# Patient Record
Sex: FEMALE | Race: BLACK OR AFRICAN AMERICAN | Employment: FULL TIME | URBAN - METROPOLITAN AREA
[De-identification: names, ages, dates, MRNs, and addresses within clinical notes are randomized per-mention and may not be internally consistent; named-entity substitution may affect disease eponyms.]

---

## 2021-04-14 PROBLEM — N64.3 GALACTORRHEA: Status: ACTIVE | Noted: 2021-04-14

## 2021-04-15 ENCOUNTER — HOSPITAL ENCOUNTER (OUTPATIENT)
Dept: LAB | Age: 51
Discharge: HOME | End: 2021-04-15
Payer: COMMERCIAL

## 2021-04-15 DIAGNOSIS — E23.6 EMPTY SELLA (HCC): ICD-10-CM

## 2021-04-15 DIAGNOSIS — E22.1 HYPERPROLACTINEMIA (HCC): ICD-10-CM

## 2021-04-15 LAB
ALBUMIN SERPL-MCNC: 3.6 G/DL (ref 3.5–5)
ALBUMIN/GLOB SERPL: 0.9 {RATIO} (ref 1.2–3.5)
ALP SERPL-CCNC: 81 U/L (ref 50–136)
ALT SERPL-CCNC: 13 U/L (ref 12–65)
ANION GAP SERPL CALC-SCNC: 6 MMOL/L (ref 7–16)
AST SERPL-CCNC: 10 U/L (ref 15–37)
BILIRUB SERPL-MCNC: 0.3 MG/DL (ref 0.2–1.1)
BUN SERPL-MCNC: 10 MG/DL (ref 6–23)
CALCIUM SERPL-MCNC: 9.4 MG/DL (ref 8.3–10.4)
CHLORIDE SERPL-SCNC: 106 MMOL/L (ref 98–107)
CO2 SERPL-SCNC: 27 MMOL/L (ref 21–32)
CORTIS AM PEAK SERPL-MCNC: 18.8 UG/DL (ref 7–25)
CREAT SERPL-MCNC: 0.99 MG/DL (ref 0.6–1)
GLOBULIN SER CALC-MCNC: 4.1 G/DL (ref 2.3–3.5)
GLUCOSE SERPL-MCNC: 71 MG/DL (ref 65–100)
POTASSIUM SERPL-SCNC: 4.4 MMOL/L (ref 3.5–5.1)
PROLACTIN SERPL-MCNC: 18.5 NG/ML
PROT SERPL-MCNC: 7.7 G/DL (ref 6.3–8.2)
SODIUM SERPL-SCNC: 139 MMOL/L (ref 136–145)
T4 FREE SERPL-MCNC: 0.9 NG/DL (ref 0.78–1.46)
TSH SERPL DL<=0.005 MIU/L-ACNC: 1.3 UIU/ML (ref 0.36–3.74)

## 2021-04-15 PROCEDURE — 80053 COMPREHEN METABOLIC PANEL: CPT

## 2021-04-15 PROCEDURE — 84305 ASSAY OF SOMATOMEDIN: CPT

## 2021-04-15 PROCEDURE — 84439 ASSAY OF FREE THYROXINE: CPT

## 2021-04-15 PROCEDURE — 84443 ASSAY THYROID STIM HORMONE: CPT

## 2021-04-15 PROCEDURE — 84146 ASSAY OF PROLACTIN: CPT

## 2021-04-15 PROCEDURE — 36415 COLL VENOUS BLD VENIPUNCTURE: CPT

## 2021-04-15 PROCEDURE — 82533 TOTAL CORTISOL: CPT

## 2021-04-16 LAB — IGF-I SERPL-MCNC: 148 NG/ML (ref 70–225)

## 2022-03-19 PROBLEM — N64.3 GALACTORRHEA: Status: ACTIVE | Noted: 2021-04-14

## 2022-05-27 DIAGNOSIS — E22.1 HYPERPROLACTINEMIA (HCC): ICD-10-CM

## 2022-05-27 DIAGNOSIS — E22.1 HYPERPROLACTINEMIA (HCC): Primary | ICD-10-CM

## 2022-06-01 ENCOUNTER — OFFICE VISIT (OUTPATIENT)
Dept: ENDOCRINOLOGY | Age: 52
End: 2022-06-01
Payer: COMMERCIAL

## 2022-06-01 VITALS
BODY MASS INDEX: 31.78 KG/M2 | HEART RATE: 68 BPM | WEIGHT: 191 LBS | OXYGEN SATURATION: 98 % | DIASTOLIC BLOOD PRESSURE: 57 MMHG | SYSTOLIC BLOOD PRESSURE: 106 MMHG

## 2022-06-01 DIAGNOSIS — E23.6 EMPTY SELLA (HCC): ICD-10-CM

## 2022-06-01 DIAGNOSIS — D35.2 PROLACTINOMA (HCC): Primary | ICD-10-CM

## 2022-06-01 DIAGNOSIS — E22.1 HYPERPROLACTINEMIA (HCC): ICD-10-CM

## 2022-06-01 PROCEDURE — 99213 OFFICE O/P EST LOW 20 MIN: CPT | Performed by: INTERNAL MEDICINE

## 2022-06-01 RX ORDER — CABERGOLINE 0.5 MG/1
0.5 TABLET ORAL
Qty: 9 TABLET | Refills: 11 | Status: SHIPPED | OUTPATIENT
Start: 2022-06-02 | End: 2022-10-26 | Stop reason: SDUPTHER

## 2022-06-01 NOTE — PROGRESS NOTES
Chavo Bradford MD, [address] [facility] [address]            Reason for visit: follow-up of hyperprolactinemia      ASSESSMENT AND PLAN:    1. Prolactinoma ([address]  She has been out of cabergoline for about 3 weeks. I will renew her prescription and reassess in 4 months. - cabergoline (DOSTINEX) 0.5 MG tablet; Take 1 tablet by mouth Twice a Week  Dispense: 9 tablet; Refill: 11  - Prolactin; Future    2. Hyperprolactinemia ([address]    3. Empty sella ([address]  Assessment of anterior pituitary function was normal (with exception of hyperprolactinemia). Follow-up and Dispositions    · Return in about 4 months (around 10/1/2022). History of Present Illness:    PITUITARY DISEASE  Deejay Sutton is here for follow-up of hyperprolactinemia and a pituitary microadenoma. These were noted as part of the evaluation of galactorrhea which started ~2020.     Symptoms: See review of systems below     Pregnancy history:  3 para 3, status post BTL     Menstrual history: regular, cycle length 28 days, menses 6-9 days     Imaging:  3/9/2021: MRI pituitary ([facility])- Empty sella. 3 x 3 mm tiny focus of hypoenhancement in the inferior lateral margin of the abdomen apophysis, likely a pituitary adenoma. No cavernous sinus or suprasellar extension. Infundibulum is slightly displaced to the left of midline. No mention of the optic chiasm.     Laboratory evaluation:  2021: Prolactin 34.2.  4/15/2021 at 8:51 AM: Prolactin 18.5, IGF-I 148, cortisol 18.8, TSH 1.300, free T4 0.9.  2021: Prolactin 1.8.     Prior/current treatment: Cabergoline 0.5 mg twice weekly was started 2021. She ran out of the medication early to mid May 2022. Review of Systems   Constitutional: Positive for fatigue (improved). Negative for unexpected weight change. Cardiovascular: Negative for palpitations. Endocrine:        Negative for galactorrhea.    Genitourinary: Negative for menstrual problem. Neurological: Negative for tremors. BP (!) 106/57 (Site: Right Upper Arm, Position: Sitting)   Pulse 68   Wt 191 lb (86.6 kg)   SpO2 98%   BMI 31.78 kg/m²   Wt Readings from Last 3 Encounters:   06/01/22 191 lb (86.6 kg)   12/01/21 140 lb (63.5 kg)       Physical Exam  HENT:      Head: Normocephalic. Neck:      Thyroid: No thyroid mass or thyromegaly. Cardiovascular:      Rate and Rhythm: Normal rate. Pulmonary:      Effort: No respiratory distress. Breath sounds: Normal breath sounds. Neurological:      Mental Status: She is alert. Psychiatric:         Mood and Affect: Mood normal.         Behavior: Behavior normal.         Orders Placed This Encounter   Procedures    Prolactin     Standing Status:   Future     Standing Expiration Date:   6/1/2023       Current Outpatient Medications   Medication Sig Dispense Refill    [START ON 6/2/2022] cabergoline (DOSTINEX) 0.5 MG tablet Take 1 tablet by mouth Twice a Week 9 tablet 11    cetirizine (ZYRTEC) 10 MG tablet Take by mouth      ferrous sulfate (IRON 325) 325 (65 Fe) MG tablet TAKE 1 TABLET BY MOUTH TWICE DAILY      omeprazole (PRILOSEC) 20 MG delayed release capsule TAKE 1 CAPSULE BY MOUTH EVERY DAY IN THE MORNING       No current facility-administered medications for this visit. Daphnie Diallo MD, FACE      Portions of this note were generated with the assistance of voice recognition software. As such, some errors in transcription may be present.

## 2022-06-05 LAB — PROLACTIN SERPL-MCNC: 10.5 NG/ML

## 2022-10-26 ENCOUNTER — TELEMEDICINE (OUTPATIENT)
Dept: ENDOCRINOLOGY | Age: 52
End: 2022-10-26
Payer: COMMERCIAL

## 2022-10-26 DIAGNOSIS — E23.6 EMPTY SELLA (HCC): ICD-10-CM

## 2022-10-26 DIAGNOSIS — D35.2 PROLACTINOMA (HCC): Primary | ICD-10-CM

## 2022-10-26 DIAGNOSIS — E22.1 HYPERPROLACTINEMIA (HCC): ICD-10-CM

## 2022-10-26 PROCEDURE — 99213 OFFICE O/P EST LOW 20 MIN: CPT | Performed by: INTERNAL MEDICINE

## 2022-10-26 RX ORDER — CABERGOLINE 0.5 MG/1
0.5 TABLET ORAL
Qty: 9 TABLET | Refills: 11 | Status: SHIPPED | OUTPATIENT
Start: 2022-10-27

## 2022-10-26 NOTE — PROGRESS NOTES
Oneil Teague MD, [address]            Reason for visit: follow-up of hyperprolactinemia    I was in the office while conducting this encounter. Consent:  Mary Leija, who was evaluated through a synchronous (real-time) audio-video encounter, and/or her healthcare decision maker, is aware that it is a billable service, which includes applicable co-pays, with coverage as determined by her insurance carrier. She provided verbal consent to proceed and patient identification was verified. This visit was conducted pursuant to the emergency declaration under the [address] [place] [address], [address] [place] [address] authority and the [employer] Act. A caregiver was present when appropriate. Ability to conduct physical exam was limited. The patient was located at home in a state where the provider was licensed to provide care. This virtual visit was conducted via [address] Pursuant to the emergency declaration under the [address] [place] [address], [facility] authority and the [employer] Act, this Virtual  Visit was conducted to reduce the patient's risk of exposure to COVID-19 and provide continuity of care for an established patient. Services were provided through a video synchronous discussion virtually to substitute for in-person clinic visit. Due to this being a TeleHealth evaluation, many elements of the physical examination are unable to be assessed. --Adalberto Krause MD on 10/26/2022 at 2:44 PM        ASSESSMENT AND PLAN:    1. Prolactinoma ([address]  She has been out of cabergoline for about 4 weeks. I will renew her prescription and reassess in 6 months. - cabergoline (DOSTINEX) 0.5 MG tablet; Take 1 tablet by mouth Twice a Week  Dispense: 9 tablet; Refill: 11  - Prolactin; Future    2.  Hyperprol[address]emia ([facility] [address]    3. Empty sella ([facility] [address]  Assessment of anterior pituitary function was normal (with exception of hyperprolactinemia). Follow-up and Dispositions    Return in about 6 months (around 2023). History of Present Illness:    PITUITARY DISEASE  Mychal Zuluaga is here for follow-up of hyperprolactinemia and a pituitary microadenoma. These were noted as part of the evaluation of galactorrhea which started ~2020. Symptoms: See review of systems below     Pregnancy history:  3 para 3, status post BTL     Menstrual history: regular, cycle length 28 days, menses 6-9 days     Imaging:  3/9/2021: MRI pituitary ([facility])- Empty sella. 3 x 3 mm tiny focus of hypoenhancement in the inferior lateral margin of the abdomen apophysis, likely a pituitary adenoma. No cavernous sinus or suprasellar extension. Infundibulum is slightly displaced to the left of midline. No mention of the optic chiasm. Laboratory evaluation:  2021: Prolactin 34.2.  4/15/2021 at 8:51 AM: Prolactin 18.5, IGF-I 148, cortisol 18.8, TSH 1.300, free T4 0.9.  2021: Prolactin 1.8.  2022: Prolactin 10.5. Prior/current treatment: Cabergoline 0.5 mg twice weekly was started 2021. She ran out of the medication early to mid May 2022. She resumed it in 2022; she ran out again in ~2022. Review of Systems   Constitutional:  Negative for fatigue and unexpected weight change. Cardiovascular:  Negative for palpitations. Endocrine:        Negative for galactorrhea. Genitourinary:  Negative for menstrual problem. Neurological:  Negative for tremors. There were no vitals taken for this visit. Wt Readings from Last 3 Encounters:   22 191 lb (86.6 kg)   21 140 lb (63.5 kg)       Physical Exam  Constitutional:       Appearance: Normal appearance. HENT:      Head: Normocephalic.       Nose: Nose normal.   Eyes:      Extraocular Movements: Extraocular movements intact. Pulmonary:      Effort: Pulmonary effort is normal.   Musculoskeletal:         General: Normal range of motion. Right shoulder: Normal.      Left shoulder: Normal.      Cervical back: Normal range of motion. Skin:     Coloration: Skin is not jaundiced or pale. Neurological:      General: No focal deficit present. Mental Status: She is alert. Mental status is at baseline. Psychiatric:         Mood and Affect: Mood normal.         Behavior: Behavior normal.       Orders Placed This Encounter   Procedures    Prolactin     Standing Status:   Future     Standing Expiration Date:   10/26/2023       Current Outpatient Medications   Medication Sig Dispense Refill    [START ON 10/27/2022] cabergoline (DOSTINEX) 0.5 MG tablet Take 1 tablet by mouth Twice a Week 9 tablet 11    cetirizine (ZYRTEC) 10 MG tablet Take by mouth      ferrous sulfate (IRON 325) 325 (65 Fe) MG tablet TAKE 1 TABLET BY MOUTH TWICE DAILY      omeprazole (PRILOSEC) 20 MG delayed release capsule TAKE 1 CAPSULE BY MOUTH EVERY DAY IN THE MORNING       No current facility-administered medications for this visit. Aurelio Payne MD, FACE      Portions of this note were generated with the assistance of voice recognition software. As such, some errors in transcription may be present.

## 2023-05-19 ENCOUNTER — OFFICE VISIT (OUTPATIENT)
Dept: ENDOCRINOLOGY | Age: 53
End: 2023-05-19
Payer: COMMERCIAL

## 2023-05-19 VITALS
OXYGEN SATURATION: 100 % | HEART RATE: 58 BPM | SYSTOLIC BLOOD PRESSURE: 106 MMHG | WEIGHT: 201 LBS | BODY MASS INDEX: 33.45 KG/M2 | DIASTOLIC BLOOD PRESSURE: 80 MMHG

## 2023-05-19 DIAGNOSIS — D35.2 PROLACTINOMA (HCC): Primary | ICD-10-CM

## 2023-05-19 DIAGNOSIS — E23.6 EMPTY SELLA (HCC): ICD-10-CM

## 2023-05-19 DIAGNOSIS — D35.2 PROLACTINOMA (HCC): ICD-10-CM

## 2023-05-19 DIAGNOSIS — E22.1 HYPERPROLACTINEMIA (HCC): ICD-10-CM

## 2023-05-19 LAB — PROLACTIN SERPL-MCNC: 1.2 NG/ML

## 2023-05-19 PROCEDURE — 99213 OFFICE O/P EST LOW 20 MIN: CPT | Performed by: INTERNAL MEDICINE

## 2023-05-19 RX ORDER — CABERGOLINE 0.5 MG/1
0.5 TABLET ORAL
Qty: 9 TABLET | Refills: 11 | Status: SHIPPED | OUTPATIENT
Start: 2023-05-22

## 2023-05-19 RX ORDER — CABERGOLINE 0.5 MG/1
0.5 TABLET ORAL
Qty: 9 TABLET | Refills: 11
Start: 2023-05-22 | End: 2023-05-19 | Stop reason: SDUPTHER

## 2023-05-19 NOTE — PROGRESS NOTES
Guerda Grant MD, [address]            Reason for visit: follow-up of hyperprolactinemia        ASSESSMENT AND PLAN:    1. Prolactinoma ([address]  I will have her check prolactin today we will notify her of results. Return in 6 months with labs. - cabergoline (DOSTINEX) 0.5 MG tablet; Take 1 tablet by mouth Twice a Week  Dispense: 9 tablet; Refill: 11  - Prolactin; Future    2. Hyperprolactinemia ([address]    3. Empty sella ([address]  Assessment of anterior pituitary function was normal (with exception of hyperprolactinemia). Follow-up and Dispositions    Return in about 6 months (around 2023). History of Present Illness:    PITUITARY DISEASE  Dameon Cole is here for follow-up of hyperprolactinemia and a pituitary microadenoma. These were noted as part of the evaluation of galactorrhea which started ~2020. Symptoms: See review of systems below     Pregnancy history:  3 para 3, status post BTL     Menstrual history: regular, cycle length 28 days     Imaging:  3/9/2021: MRI pituitary ([facility])- Empty sella. 3 x 3 mm tiny focus of hypoenhancement in the inferior lateral margin of the abdomen apophysis, likely a pituitary adenoma. No cavernous sinus or suprasellar extension. Infundibulum is slightly displaced to the left of midline. No mention of the optic chiasm. Laboratory evaluation:  2021: Prolactin 34.2.  4/15/2021 at 8:51 AM: Prolactin 18.5, IGF-I 148, cortisol 18.8, TSH 1.300, free T4 0.9.  2021: Prolactin 1.8.  2022: Prolactin 10.5. Prior/current treatment: Cabergoline 0.5 mg twice weekly was started 2021. She ran out of the medication early to mid May 2022. She resumed it in 2022; she ran out again in ~2022. I had her resume it in 2022. Review of Systems   Constitutional:  Positive for unexpected weight change (gained 10 pounds in the last year).  Negative for

## 2023-08-18 ENCOUNTER — OFFICE VISIT (OUTPATIENT)
Dept: ENDOCRINOLOGY | Age: 53
End: 2023-08-18
Payer: COMMERCIAL

## 2023-08-18 VITALS — WEIGHT: 200 LBS | DIASTOLIC BLOOD PRESSURE: 68 MMHG | SYSTOLIC BLOOD PRESSURE: 100 MMHG | BODY MASS INDEX: 33.28 KG/M2

## 2023-08-18 DIAGNOSIS — D35.2 PROLACTINOMA (HCC): ICD-10-CM

## 2023-08-18 DIAGNOSIS — D35.2 PROLACTINOMA (HCC): Primary | ICD-10-CM

## 2023-08-18 DIAGNOSIS — E22.1 HYPERPROLACTINEMIA (HCC): ICD-10-CM

## 2023-08-18 DIAGNOSIS — E23.6 EMPTY SELLA (HCC): ICD-10-CM

## 2023-08-18 LAB — PROLACTIN SERPL-MCNC: 0.8 NG/ML

## 2023-08-18 PROCEDURE — 99213 OFFICE O/P EST LOW 20 MIN: CPT | Performed by: INTERNAL MEDICINE

## 2023-08-18 RX ORDER — CABERGOLINE 0.5 MG/1
0.5 TABLET ORAL
Qty: 9 TABLET | Refills: 11 | Status: SHIPPED | OUTPATIENT
Start: 2023-08-21

## 2023-08-18 NOTE — PROGRESS NOTES
Tosha Harris MD, [facility]            Reason for visit: follow-up of hyperprolactinemia        ASSESSMENT AND PLAN:    1. Prolactinoma ([address])  I will have her check prolactin today and will notify her of results. Return in 12 months with labs. - cabergoline (DOSTINEX) 0.5 MG tablet; Take 1 tablet by mouth Twice a Week  Dispense: 9 tablet; Refill: 11  - Prolactin; Future    2. Hyperprolactinemia ([address])    3. Empty sella ([address])  Assessment of anterior pituitary function was normal (with exception of hyperprolactinemia). Follow-up and Dispositions    Return in about 1 year (around 2024). History of Present Illness:    PITUITARY DISEASE  Jessica Thakur is here for follow-up of hyperprolactinemia and a pituitary microadenoma. These were noted as part of the evaluation of galactorrhea which started ~2020. Symptoms: See review of systems below     Pregnancy history:  3 para 3, status post BTL     Menstrual history: regular, cycle length 28 days     Imaging:  3/9/2021: MRI pituitary ([facility])- Empty sella. 3 x 3 mm tiny focus of hypoenhancement in the inferior lateral margin of the abdomen apophysis, likely a pituitary adenoma. No cavernous sinus or suprasellar extension. Infundibulum is slightly displaced to the left of midline. No mention of the optic chiasm. Laboratory evaluation:  2021: Prolactin 34.2.  4/15/2021 at 8:51 AM: Prolactin 18.5, IGF-I 148, cortisol 18.8, TSH 1.300, free T4 0.9.  2021: Prolactin 1.8.  2022: Prolactin 10.5.  2023: Prolactin 1.2. Prior/current treatment: Cabergoline 0.5 mg twice weekly was started 2021. She ran out of the medication early to mid May 2022. She resumed it in 2022; she ran out again in ~2022. I had her resume it in 2022.       Review of Systems   Constitutional:  Positive for unexpected weight change (previously gained 10

## 2023-08-21 DIAGNOSIS — D35.2 PROLACTINOMA (HCC): ICD-10-CM

## 2023-08-21 RX ORDER — CABERGOLINE 0.5 MG/1
0.5 TABLET ORAL
Qty: 9 TABLET | Refills: 11 | Status: SHIPPED | OUTPATIENT
Start: 2023-08-21

## 2024-06-19 DIAGNOSIS — D35.2 PROLACTINOMA (HCC): ICD-10-CM

## 2024-06-19 RX ORDER — CABERGOLINE 0.5 MG/1
TABLET ORAL
Qty: 9 TABLET | Refills: 2 | Status: SHIPPED | OUTPATIENT
Start: 2024-06-20

## 2024-08-19 ENCOUNTER — OFFICE VISIT (OUTPATIENT)
Dept: ENDOCRINOLOGY | Age: 54
End: 2024-08-19
Payer: COMMERCIAL

## 2024-08-19 VITALS
OXYGEN SATURATION: 97 % | DIASTOLIC BLOOD PRESSURE: 74 MMHG | SYSTOLIC BLOOD PRESSURE: 108 MMHG | HEIGHT: 65 IN | BODY MASS INDEX: 32.82 KG/M2 | HEART RATE: 64 BPM | WEIGHT: 197 LBS | RESPIRATION RATE: 18 BRPM

## 2024-08-19 DIAGNOSIS — E22.1 HYPERPROLACTINEMIA (HCC): ICD-10-CM

## 2024-08-19 DIAGNOSIS — E23.6 EMPTY SELLA (HCC): ICD-10-CM

## 2024-08-19 DIAGNOSIS — D35.2 PROLACTINOMA (HCC): ICD-10-CM

## 2024-08-19 DIAGNOSIS — D35.2 PROLACTINOMA (HCC): Primary | ICD-10-CM

## 2024-08-19 LAB — PROLACTIN SERPL-MCNC: 0.6 NG/ML (ref 4.8–23.3)

## 2024-08-19 PROCEDURE — 99213 OFFICE O/P EST LOW 20 MIN: CPT | Performed by: INTERNAL MEDICINE

## 2024-08-19 RX ORDER — CABERGOLINE 0.5 MG/1
TABLET ORAL
Qty: 26 TABLET | Refills: 3 | Status: SHIPPED | OUTPATIENT
Start: 2024-08-19

## 2024-08-19 RX ORDER — CABERGOLINE 0.5 MG/1
TABLET ORAL
Qty: 26 TABLET | Refills: 3 | Status: SHIPPED | OUTPATIENT
Start: 2024-08-19 | End: 2024-08-19 | Stop reason: SDUPTHER

## 2024-08-19 RX ORDER — CABERGOLINE 0.5 MG/1
TABLET ORAL
Qty: 9 TABLET | Refills: 2 | Status: SHIPPED | OUTPATIENT
Start: 2024-08-19 | End: 2024-08-19

## 2024-08-19 NOTE — PROGRESS NOTES
COLTON Carnes MD, [facility]   [facility]            Reason for visit: follow-up of hyperprolactinemia        ASSESSMENT AND PLAN:    1. Prolactinoma ([facility])  I will have her check prolactin today and will notify her of results.  If normal, she can return in 12 months with labs.  I will also arrange repeat MRI.  - cabergoline (DOSTINEX) 0.5 MG tablet; Take 1 tablet by mouth Twice a Week  Dispense: 9 tablet; Refill: 11  - Prolactin; Future    2. Hyperprolactinemia ([facility])    3. Empty sella ([facility])  Assessment of anterior pituitary function was normal (with exception of hyperprolactinemia).        Follow-up and Dispositions    Return in about 1 year (around 2025).               History of Present Illness:    PITUITARY DISEASE  Jennifer Mueller is here for follow-up of hyperprolactinemia and a pituitary microadenoma.  These were noted as part of the evaluation of galactorrhea which started ~2020.     Symptoms: See review of systems below     Pregnancy history:  3 para 3, status post BTL     Menstrual history: more irregular than before (recent oligomenorrhea)     Imaging:  3/9/2021: MRI pituitary ([facility])- Empty sella.  3 x 3 mm tiny focus of hypoenhancement in the inferior lateral margin of the adenohypophysis, likely a pituitary adenoma.  No cavernous sinus or suprasellar extension.  Infundibulum is slightly displaced to the left of midline.  No mention of the optic chiasm.     Laboratory evaluation:  2021: Prolactin 34.2.  4/15/2021 at 8:51 AM: Prolactin 18.5, IGF-I 148, cortisol 18.8, TSH 1.300, free T4 0.9.  2021: Prolactin 1.8.  2022: Prolactin 10.5.  2023: Prolactin 1.2.  2023: Prolactin 0.8.     Prior/current treatment: Cabergoline 0.5 mg twice weekly was started 2021.  She ran out of the medication early to mid May 2022.  She resumed it in 2022; she ran out again in ~2022.  I had her resume it in October

## 2025-04-20 DIAGNOSIS — D35.2 PROLACTINOMA (HCC): ICD-10-CM

## 2025-04-21 RX ORDER — CABERGOLINE 0.5 MG/1
TABLET ORAL
Qty: 26 TABLET | Refills: 3 | Status: SHIPPED | OUTPATIENT
Start: 2025-04-21

## 2025-08-19 ENCOUNTER — TELEPHONE (OUTPATIENT)
Dept: ENDOCRINOLOGY | Age: 55
End: 2025-08-19

## 2025-08-19 DIAGNOSIS — D35.2 PROLACTINOMA (HCC): ICD-10-CM

## 2025-08-19 LAB — PROLACTIN SERPL-MCNC: 17.1 NG/ML (ref 4.8–23.3)

## 2025-08-20 ENCOUNTER — OFFICE VISIT (OUTPATIENT)
Dept: ENDOCRINOLOGY | Age: 55
End: 2025-08-20
Payer: COMMERCIAL

## 2025-08-20 VITALS
OXYGEN SATURATION: 98 % | SYSTOLIC BLOOD PRESSURE: 110 MMHG | DIASTOLIC BLOOD PRESSURE: 78 MMHG | HEIGHT: 64 IN | HEART RATE: 70 BPM | WEIGHT: 203.2 LBS | BODY MASS INDEX: 34.69 KG/M2

## 2025-08-20 DIAGNOSIS — D35.2 PROLACTINOMA (HCC): Primary | ICD-10-CM

## 2025-08-20 PROCEDURE — 99213 OFFICE O/P EST LOW 20 MIN: CPT | Performed by: INTERNAL MEDICINE

## 2025-08-20 RX ORDER — CABERGOLINE 0.5 MG/1
TABLET ORAL
Qty: 26 TABLET | Refills: 3 | Status: SHIPPED | OUTPATIENT
Start: 2025-08-20